# Patient Record
Sex: MALE | Race: WHITE | URBAN - METROPOLITAN AREA
[De-identification: names, ages, dates, MRNs, and addresses within clinical notes are randomized per-mention and may not be internally consistent; named-entity substitution may affect disease eponyms.]

---

## 2019-11-19 ENCOUNTER — HISTORICAL (OUTPATIENT)
Dept: HEMATOLOGY/ONCOLOGY | Facility: CLINIC | Age: 39
End: 2019-11-19

## 2022-04-28 NOTE — CONSULTS
Patient:   SHERRY THOMASON             MRN: [id number]            FIN: [id number]               Age:   39 years     Sex:  Male     :  1980   Associated Diagnoses:   Cyclic neutropenia   Author:   Aixa Watson MD      Visit Information   Visit type:  Hematology consultation.    Primary: Dr. Sherrie Kruse      Chief Complaint   cc:  I am here for my blood counts.    Problem List:    Mild leukopenia with normal differentiation-- this does seem to be cyclic .           Interval History   39 year old male was referred and seen in our clinic 19 for a mildly low WBC.  Wife had lymphoma and moved back to area for treatments  2018.  They were living in [place].  They were there for 5 months.  Malaria is always a concern in [place] but this disease was  tracked and not seen in their area.    He had more exposures to  infections as his wife had pneumonias  (during her treatments) and around 2018, the whole family had a GI bug.  This might explain his lower WBC at that time.  His family and he just had another GI illness recently.    They travel out of the country a good bit and have more travel planned.    CBC explained in detail to him.    PMH:  vasectomy with reversal.           left hernia surgery     Social History:   .  4 children.            Born in [facility].   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                 Trained as  (desk job)    Family History: Mother alive and well.                            Father alive and well, skin cancer                            Sister benign thyroid disease      Labs:   18- WBC 2.5, Hgb 14.3, plt 256, normal differentiation except mono 14.4%  19- WBC 6, Hgb 14.1, Plt 343, normal diff, mono 6.9% (normal)  19- WBC 3.3, Hgb 14.2, plt 254, MCV 88.7, normal diff, mono 6.2 (normal), alb 4.5         Review of Systems   Constitutional:  No fever, No chills, No sweats, No weakness,  No fatigue, No decreased activity.    Eye:  No recent visual problem, No icterus, No blurring, No double vision.    Ear/Nose/Mouth/Throat:  no ear pain, no ringing of ears.    Respiratory:  No shortness of breath, No cough, No sputum production, No hemoptysis, No wheezing, No cyanosis, No pleuritic pain.    Cardiovascular:  No palpitations, No bradycardia, No tachycardia, No peripheral edema, No syncope.    Breast:  No lump/ mass, No nipple inversion, No nipple discharge, No redness, No peau d' orange.    Gastrointestinal:  No nausea, No vomiting, No diarrhea, No constipation, No heartburn, No hematemesis.    Genitourinary:  No dysuria, No hematuria, No change in urine stream, No urethral discharge.    Hematology/Lymphatics:  No bruising tendency, No bleeding tendency, No bruise, No hemorrhage, No petechiae, No swollen lymph glands.    Endocrine:  No excessive thirst, No polyuria, No cold intolerance, No heat intolerance, No excessive hunger.    Immunologic:  No high dose steroids, Not immunocompromised, No malaise.    Musculoskeletal:  No joint pain, No muscle pain, No claudication, No decreased range of motion, No trauma.    Integumentary:  No rash, No pruritus, No breakdown, No dryness, No skin lesion.    Neurologic:  No altered mental status, No ataxia, No headache, No memory loss, No speech problems.    Psychiatric:  Delusional, No anxiety, No depression, No hallucinations, No memory difficulties, No sleeping problems.    All other systems are negative      Health Status   Allergies:    No active allergies have been recorded.      Physical Examination   Vital Signs   11/19/2019 10:49 CST     Temperature Oral          36.9 DegC                             Temperature Oral (calculated)             98.42 DegF                             Peripheral Pulse Rate     69 bpm                             Respiratory Rate          18 br/min                             SpO2                      100 %                              Oxygen Therapy            Room air                             Systolic Blood Pressure   115 mmHg                             Diastolic Blood Pressure  76 mmHg     Measurements from flowsheet : Measurements   11/19/2019 10:49 CST     Weight Dosing             76.80 kg                             Weight Measured           76.80 kg                             Weight Measured and Calculated in Lbs     169.31 lb                             Height/Length Dosing      173 cm                             Height/Length Measured    173 cm                             BSA Measured              1.92 m2                             Body Mass Index Measured  25.66 kg/m2     General:  Alert and oriented, No acute distress, healthy male.    Eye:  Extraocular movements are intact, Normal conjunctiva.    HENT:  Normocephalic, Normal hearing.    Neck:  Supple, Non-tender.    Respiratory:  Respirations are non-labored.    Cardiovascular:  Normal rate, Regular rhythm, Normal peripheral perfusion.    Gastrointestinal:  Soft, Non-tender, Non-distended.    Lymphatics:  No lymphadenopathy neck, axilla, groin.    Musculoskeletal:  Normal range of motion, Normal strength, No tenderness, No swelling, No deformity, Normal gait.    Integumentary:  Warm.    Neurologic:  Alert, Oriented.    Cognition and Speech:  Oriented, Speech clear and coherent, Functional cognition intact.    Psychiatric:  Cooperative, Appropriate mood & affect, Normal judgment.          ECOG Performance Scale: 0 - Fully active; no performance restrictions.      Impression and Plan   Diagnosis     Cyclic neutropenia (QPT11-OP D70.4).         Impression:    1. Cyclic neutropenia is a not so common but real disorder that does not require treatment.  However, with all his travels and GI illness in the past and recently, he may just be responding to these illnesses/viruses.  I ordered CBC/smear but since he recently had another GI virus , I will wait until December 2019 to  redraw so as not to confuse the issue.      Plan: 1. CBC with diff, peripheral smear review. in December 2019           2. RTC  as needed-- He will call when he returns into the country.    Thank you for the referral.    Aixa Watson MD